# Patient Record
Sex: FEMALE | Race: WHITE | NOT HISPANIC OR LATINO | Employment: STUDENT | URBAN - METROPOLITAN AREA
[De-identification: names, ages, dates, MRNs, and addresses within clinical notes are randomized per-mention and may not be internally consistent; named-entity substitution may affect disease eponyms.]

---
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
## 2024-03-16 ENCOUNTER — HOSPITAL ENCOUNTER (EMERGENCY)
Facility: HOSPITAL | Age: 19
Discharge: HOME | End: 2024-03-16
Attending: EMERGENCY MEDICINE | Admitting: EMERGENCY MEDICINE
Payer: COMMERCIAL

## 2024-03-16 VITALS
HEIGHT: 63 IN | TEMPERATURE: 97 F | BODY MASS INDEX: 23.62 KG/M2 | RESPIRATION RATE: 16 BRPM | WEIGHT: 133.3 LBS | OXYGEN SATURATION: 99 % | SYSTOLIC BLOOD PRESSURE: 122 MMHG | HEART RATE: 70 BPM | DIASTOLIC BLOOD PRESSURE: 62 MMHG

## 2024-03-16 DIAGNOSIS — S09.90XA CLOSED HEAD INJURY, INITIAL ENCOUNTER: ICD-10-CM

## 2024-03-16 PROCEDURE — 99282 EMERGENCY DEPT VISIT SF MDM: CPT

## 2024-03-16 ASSESSMENT — COLUMBIA-SUICIDE SEVERITY RATING SCALE - C-SSRS
1. IN THE PAST MONTH, HAVE YOU WISHED YOU WERE DEAD OR WISHED YOU COULD GO TO SLEEP AND NOT WAKE UP?: NO
6. HAVE YOU EVER DONE ANYTHING, STARTED TO DO ANYTHING, OR PREPARED TO DO ANYTHING TO END YOUR LIFE?: NO
2. HAVE YOU ACTUALLY HAD ANY THOUGHTS OF KILLING YOURSELF IN THE PAST MONTH?: NO

## 2024-03-16 ASSESSMENT — ACTIVITIES OF DAILY LIVING (ADL)
ADLS_ACUITY_SCORE: 33
ADLS_ACUITY_SCORE: 33

## 2024-03-16 NOTE — ED PROVIDER NOTES
"EMERGENCY DEPARTMENT NOTE     Name: Sydine Mendoza    Age/Sex: 18 year old female   MRN: [id number]   Evaluation Date & Time:  3/16/2024  5:03 PM    PCP:    [facility]   ED Provider: Emeka Mancia D.O.       CHIEF COMPLAINT    Fall and Head Injury       DIAGNOSIS & DISPOSITION/MEDICAL DECISION MAKING     1. Closed head injury, initial encounter        Sydnie Mendoza is a 18 year old female without significant past medical history who presents to the emergency department for evaluation of head injury    Differential  diagnosis considered included but not limited to neck process including skull fracture, ICH, subdural, cervical spine fracture    Medical Decision Making  Patient presenting after closed head injury while snowboarding.  No loss of consciousness.  No subsequent headache or vomiting.  Patient on exam at 2 cm left occipital hematoma, cervical spine nontender.  Neurologically intact.  Discussed risks and benefits of CT including long-term risk of radiation.  After discussion patient and mother were verbal with discharge with continued monitoring at home.  Currently no symptoms of concussion but discussed concussion management.  Patient will be discharged.  She was Tylenol ibuprofen for any mild headaches.  If severe headache not improved with Tylenol ibuprofen, vomiting or change in mental status will be return to the emergency department.    Interventions:None  Discharge Vital Signs:/62   Pulse 70   Temp 97  F (36.1  C) (Temporal)   Resp 16   Ht 1.6 m (5' 3\")   Wt 60.5 kg (133 lb 4.8 oz)   LMP 12/26/2023   SpO2 99%   BMI 23.61 kg/m       DISPOSITION: Home    Diagnostic studies:  Imaging:  No orders to display      Lab:  Labs Ordered and Resulted from Time of ED Arrival to Time of ED Departure - No data to display            Triage note reviewed:Pt presents after a fall while snowboarding where she caught an edge and hit the back of her head on a tree. Large lump to back " of head. Denies LOC. Pt has had a concussion in the past. Took 2 Advil after fall. Complains of neck soreness but states it always hurts.      Triage Assessment (Adult)       Row Name 03/16/24 1656          Triage Assessment    Airway WDL WDL        Respiratory WDL    Respiratory WDL WDL        Peripheral/Neurovascular WDL    Peripheral Neurovascular WDL WDL                         History:  Supplemental history from: Patient's mother  External Record(s) reviewed: Primary care office visit November 2023, pulmonology televisit December 22, 2023    Work Up:  Chart documentation includes differential considered and any EKGs or imaging independently interpreted by provider, where specified.  In additional to work up documented, I considered the following work up: CT of the head but deferred after shared decision-making conversation    External consultation:  Discussion of management with another provider: NA    Complicating factors:  Care impacted by chronic illness: N/A  Care affected by social determinants of health: Access to medical care    Disposition considerations: Discharge. No recommendations on prescription strength medication(s). N/A.    At the conclusion of the encounter I discussed the results of all of the tests and the disposition. The questions were answered. The patient or family acknowledged understanding and was agreeable with the care plan.    TOTAL CRITICAL CARE TIME (EXCLUDING PROCEDURES): Not applicable    PROCEDURES:   None    EMERGENCY DEPARTMENT COURSE   5:44 PM I met with the patient to gather history and to perform my initial exam.  We discussed treatment options and the plan for care while in the Emergency Department.    ED INTERVENTIONS   Medications - No data to display    DISCHARGE MEDICATIONS        Review of your medicines        UNREVIEWED medicines. Ask your doctor about these medicines        Dose / Directions   ibuprofen 200 MG tablet  Commonly known as: ADVIL/MOTRIN      Dose: 200  "mg  Take 200 mg by mouth every 4 hours as needed for pain  Refills: 0     predniSONE 20 MG tablet  Commonly known as: DELTASONE  Used for: Acute pharyngitis, unspecified etiology      40mg daily for 3 days  Quantity: 6 tablet  Refills: 0                INFORMATION SOURCE AND LIMITATIONS    History/Exam limitations: N/A  Patient information was obtained from: Patient, Patient's mother  Use of : N/A  HISTORY OF PRESENT ILLNESS   Sydnie Mendoza is a 18 year old year old female with no relevant past history , who presents to this ED for evaluation of head injury.    Patient reports snowboarding earlier today when she hit her head on a tree. She reports that she was not wearing her helmet. She states that she feels asymptomatic.    Patient denies any recent loss of consciousness or vomiting. No other complaints at this time.     REVIEW OF SYSTEMS:   All other systems reviewed and are negative except as noted above in HPI.    PATIENT HISTORY   No past medical history on file.  There is no problem list on file for this patient.    No past surgical history on file.    Allergies   Allergen Reactions    Amoxicillin Hives       OUTPATIENT MEDICATIONS     New Prescriptions    No medications on file      Vitals:    03/16/24 1655 03/16/24 1809   BP: 120/65 122/62   Pulse: 72 70   Resp: 18 16   Temp: 97  F (36.1  C)    TempSrc: Temporal    SpO2: 97% 99%   Weight: 60.5 kg (133 lb 4.8 oz)    Height: 1.6 m (5' 3\")        Physical Exam   Constitutional: Oriented to person, place, and time. Appears well-developed and well-nourished.   HEENT:    Head:2 cm hematoma on left occipital scalp.C-spine is nontender.  Neck: Normal range of motion. Neck supple.   Cardiovascular: Normal rate, regular rhythm and normal heart sounds.    Pulmonary/Chest: Normal effort  and breath sounds normal.  Chest wall atraumatic  Abdominal: Soft. Bowel sounds are normal.  No abdominal wall ecchymosis  Musculoskeletal: Tenderness to the thoracic or " lumbar spine, pelvis/hips, axial skeleton  Neurological: Alert and oriented to person, place, and time. Normal strength. No sensory deficit. No cranial nerve deficit.      DIAGNOSTICS    LABORATORY FINDINGS (REVIEWED AND INTERPRETED):  Labs Ordered and Resulted from Time of ED Arrival to Time of ED Departure - No data to display      IMAGING (REVIEWED AND INTERPRETED):  No orders to display               ILEATHA , am serving as a scribe to document services personally performed by Emeka Mancia D.O., based on my observation and the provider s statements to me.    I, Emeka Mancia D.O., attest that LEATHA LONGO  is acting in a scribe capacity, has observed my performance of the services and has documented them in accordance with my direction.    Emeka Mancia D.O.  EMERGENCY MEDICINE   03/16/24  [facility] EMERGENCY DEPARTMENT  [address] [place] [id number]  [phone]  Dept: [phone]     mEeka Mancia DO  03/17/24 0259

## 2024-03-16 NOTE — ED TRIAGE NOTES
Pt presents after a fall while snowboarding where she caught an edge and hit the back of her head on a tree. Large lump to back of head. Denies LOC. Pt has had a concussion in the past. Took 2 Advil after fall. Complains of neck soreness but states it always hurts.      Triage Assessment (Adult)       Row Name 03/16/24 1679          Triage Assessment    Airway WDL WDL        Respiratory WDL    Respiratory WDL WDL        Peripheral/Neurovascular WDL    Peripheral Neurovascular WDL WDL

## 2024-03-16 NOTE — DISCHARGE INSTRUCTIONS
Use Tylenol ibuprofen if you develop mild headache.  If you develop severe headache not improved with Tylenol or ibuprofen, have vomiting or change in mental status return to the emergency department.  You may develop symptoms of concussion which would include fatigue, nausea and intermittent headaches.  Limit your activities as needed.  If you are having symptoms of concussion follow-up with your primary care clinic next week.

## 2024-03-16 NOTE — ED NOTES
Pt a/o x 3 was snowboarding and fell hitting the back of her head on a tree. She was not wearing a carlos.

## 2024-04-19 ENCOUNTER — MEDICAL CORRESPONDENCE (OUTPATIENT)
Dept: HEALTH INFORMATION MANAGEMENT | Facility: CLINIC | Age: 19
End: 2024-04-19
Payer: COMMERCIAL

## 2024-04-23 ENCOUNTER — TRANSCRIBE ORDERS (OUTPATIENT)
Dept: OTHER | Age: 19
End: 2024-04-23

## 2024-04-23 DIAGNOSIS — R51.9 CHRONIC NONINTRACTABLE HEADACHE, UNSPECIFIED HEADACHE TYPE: Primary | ICD-10-CM

## 2024-04-23 DIAGNOSIS — G89.29 CHRONIC NONINTRACTABLE HEADACHE, UNSPECIFIED HEADACHE TYPE: Primary | ICD-10-CM

## 2024-09-03 ENCOUNTER — TELEPHONE (OUTPATIENT)
Dept: NURSING | Facility: CLINIC | Age: 19
End: 2024-09-03
Payer: COMMERCIAL

## 2024-09-03 ENCOUNTER — TELEPHONE (OUTPATIENT)
Dept: FAMILY MEDICINE | Facility: CLINIC | Age: 19
End: 2024-09-03

## 2024-09-03 NOTE — TELEPHONE ENCOUNTER
Patient called and spoke with TC. States she needs TB test today for class and she is a student at [employer], does not know if she needs mantoux or blood draw. Has not been seen in clinic here in [place] before. Hung up on TC.     Patient will need a provider appointment to place orders for TB test. RN attempted to reach patient and left message requesting a return call. Perri Curtis was willing to work in to schedule today, no appointments left in clinic. If she returns call, she will need appointment with provider.

## 2024-09-03 NOTE — TELEPHONE ENCOUNTER
Patient called to follow-up on letter for going abroad.  Pt states she was trying to call [facility].  Pt was informed she called wrong clinic.

## 2024-12-18 ENCOUNTER — OFFICE VISIT (OUTPATIENT)
Dept: PEDIATRICS | Facility: CLINIC | Age: 19
End: 2024-12-18
Payer: COMMERCIAL

## 2024-12-18 VITALS
HEIGHT: 64 IN | WEIGHT: 143 LBS | HEART RATE: 84 BPM | SYSTOLIC BLOOD PRESSURE: 112 MMHG | RESPIRATION RATE: 18 BRPM | DIASTOLIC BLOOD PRESSURE: 74 MMHG | OXYGEN SATURATION: 96 % | BODY MASS INDEX: 24.41 KG/M2

## 2024-12-18 DIAGNOSIS — Z11.4 SCREENING FOR HIV (HUMAN IMMUNODEFICIENCY VIRUS): ICD-10-CM

## 2024-12-18 DIAGNOSIS — J02.9 ACUTE PHARYNGITIS, UNSPECIFIED ETIOLOGY: Primary | ICD-10-CM

## 2024-12-18 DIAGNOSIS — Z11.3 SCREENING FOR STDS (SEXUALLY TRANSMITTED DISEASES): ICD-10-CM

## 2024-12-18 DIAGNOSIS — Z11.59 NEED FOR HEPATITIS C SCREENING TEST: ICD-10-CM

## 2024-12-18 LAB
ALBUMIN SERPL BCG-MCNC: 4 G/DL (ref 3.5–5.2)
ALP SERPL-CCNC: 78 U/L (ref 40–150)
ALT SERPL W P-5'-P-CCNC: 24 U/L (ref 0–50)
ANION GAP SERPL CALCULATED.3IONS-SCNC: 10 MMOL/L (ref 7–15)
AST SERPL W P-5'-P-CCNC: 24 U/L (ref 0–45)
BASOPHILS # BLD AUTO: 0 10E3/UL (ref 0–0.2)
BASOPHILS NFR BLD AUTO: 0 %
BILIRUB SERPL-MCNC: 0.2 MG/DL
BUN SERPL-MCNC: 7.2 MG/DL (ref 6–20)
CALCIUM SERPL-MCNC: 9.5 MG/DL (ref 8.8–10.4)
CHLORIDE SERPL-SCNC: 105 MMOL/L (ref 98–107)
CREAT SERPL-MCNC: 0.63 MG/DL (ref 0.51–0.95)
EGFRCR SERPLBLD CKD-EPI 2021: >90 ML/MIN/1.73M2
EOSINOPHIL # BLD AUTO: 0.3 10E3/UL (ref 0–0.7)
EOSINOPHIL NFR BLD AUTO: 2 %
ERYTHROCYTE [DISTWIDTH] IN BLOOD BY AUTOMATED COUNT: 11.3 % (ref 10–15)
FLUAV AG SPEC QL IA: NEGATIVE
FLUBV AG SPEC QL IA: NEGATIVE
GLUCOSE SERPL-MCNC: 139 MG/DL (ref 70–99)
HCO3 SERPL-SCNC: 25 MMOL/L (ref 22–29)
HCT VFR BLD AUTO: 42.3 % (ref 35–47)
HCV AB SERPL QL IA: NONREACTIVE
HGB BLD-MCNC: 14.2 G/DL (ref 11.7–15.7)
IMM GRANULOCYTES # BLD: 0 10E3/UL
IMM GRANULOCYTES NFR BLD: 0 %
LYMPHOCYTES # BLD AUTO: 2.2 10E3/UL (ref 0.8–5.3)
LYMPHOCYTES NFR BLD AUTO: 17 %
MCH RBC QN AUTO: 30 PG (ref 26.5–33)
MCHC RBC AUTO-ENTMCNC: 33.6 G/DL (ref 31.5–36.5)
MCV RBC AUTO: 89 FL (ref 78–100)
MONOCYTES # BLD AUTO: 0.5 10E3/UL (ref 0–1.3)
MONOCYTES NFR BLD AUTO: 4 %
MONOCYTES NFR BLD AUTO: NEGATIVE %
NEUTROPHILS # BLD AUTO: 9.6 10E3/UL (ref 1.6–8.3)
NEUTROPHILS NFR BLD AUTO: 76 %
PLATELET # BLD AUTO: 388 10E3/UL (ref 150–450)
POTASSIUM SERPL-SCNC: 3.9 MMOL/L (ref 3.4–5.3)
PROT SERPL-MCNC: 6.8 G/DL (ref 6.4–8.3)
RBC # BLD AUTO: 4.74 10E6/UL (ref 3.8–5.2)
SODIUM SERPL-SCNC: 140 MMOL/L (ref 135–145)
WBC # BLD AUTO: 12.6 10E3/UL (ref 4–11)

## 2024-12-18 PROCEDURE — 87804 INFLUENZA ASSAY W/OPTIC: CPT

## 2024-12-18 PROCEDURE — 85025 COMPLETE CBC W/AUTO DIFF WBC: CPT

## 2024-12-18 PROCEDURE — 87591 N.GONORRHOEAE DNA AMP PROB: CPT

## 2024-12-18 PROCEDURE — 86780 TREPONEMA PALLIDUM: CPT

## 2024-12-18 PROCEDURE — 36415 COLL VENOUS BLD VENIPUNCTURE: CPT

## 2024-12-18 PROCEDURE — 87389 HIV-1 AG W/HIV-1&-2 AB AG IA: CPT

## 2024-12-18 PROCEDURE — 80053 COMPREHEN METABOLIC PANEL: CPT

## 2024-12-18 PROCEDURE — 86308 HETEROPHILE ANTIBODY SCREEN: CPT

## 2024-12-18 PROCEDURE — 87491 CHLMYD TRACH DNA AMP PROBE: CPT

## 2024-12-18 PROCEDURE — 86803 HEPATITIS C AB TEST: CPT

## 2024-12-18 RX ORDER — PREDNISONE 20 MG/1
40 TABLET ORAL DAILY
Qty: 6 TABLET | Refills: 0 | OUTPATIENT
Start: 2024-12-18 | End: 2024-12-21

## 2024-12-18 RX ORDER — DROSPIRENONE AND ETHINYL ESTRADIOL 0.03MG-3MG
KIT ORAL
COMMUNITY

## 2024-12-18 RX ORDER — BUPROPION HYDROCHLORIDE 150 MG/1
150 TABLET ORAL DAILY
COMMUNITY

## 2024-12-18 ASSESSMENT — ENCOUNTER SYMPTOMS
COUGH: 1
SORE THROAT: 1

## 2024-12-18 NOTE — PROGRESS NOTES
Assessment & Plan   Sydnie is a 19-year-old female without significant medical history presenting with 11 days of persistent cough, congestion, and painful sore throat without fever or other systemic symptoms.  CBC significant only for mild leukocytosis (WBC 12.6), flu and mono negative. Cause of her symptoms is not clear at this time, but viral respiratory illness with associated throat inflammation is most likely. Given significant inflammatory throat pain, will treat with a short steroid burst. Advised continuing ibuprofen and tylenol, but limiting ibuprofen to 600mg q6h.    (J02.9) Acute pharyngitis, unspecified etiology  (primary encounter diagnosis)  Plan: Influenza A & B Antigen - Clinic Collect,         Mononucleosis screen, CBC with platelets and         differential, Comprehensive metabolic panel   - Prednisone 40mg daily x3 days    (Z11.3) Screening for STDs (sexually transmitted diseases)  Plan: Chlamydia trachomatis/Neisseria gonorrhoeae by         PCR- VAGINAL SELF-SWAB, Treponema Abs w Reflex         to RPR and Titer    (Z11.4) Screening for HIV (human immunodeficiency virus)  Plan: HIV Antigen Antibody Combo    (Z11.59) Need for hepatitis C screening test  Plan: Hepatitis C Screen Reflex to HCV RNA Quant and         Genotype    Signed:  Agustin Dumont MD  PGY2, Pediatrics  [facility]    Plan of care discussed with Dr. Hendrickson, attending pediatrician.      {FOLLOW UP PLANS (Optional) Includes COVID19 Treatment Plan:589542}    Allie Otoole is a 19 year old, presenting for the following health issues:  Cough (r90wkta) and Pharyngitis (c36ozdr)      12/18/2024     1:01 PM   Additional Questions   Roomed by avni Otoole is a 19-year-old female presenting with sore throat.  Her symptoms started 11 days ago on 12/7 with sore throat.  She briefly had chills for a few hours that first evening, which resolved.  The next day she developed congestion and cough which have been ongoing since  "then.  The congestion has gotten slightly better, but the sore throat has gotten worse.  She describes the sore throat as muscular pain with swallowing, less topical pain in the back of her throat.  She is able to drink water and eat gentle foods.  The throat pain has been so severe that it is disrupting her sleep.  She has been taking 800 mg ibuprofen every 6 hours for the last 4 days along with alternating Tylenol with little improvement in her pain.  She has not had any fever.  She is stooling normally, peeing normally, and her energy level is fine.    She reports that last year around this time she had a similar prolonged illness for which a cause was not identified.  After around a month of symptoms, she was given a short burst of steroids which seemed to help her.    She just flew in from [place] from studying abroad. She has been living with roommates in apartment, none of them are sick. Sexually active, denies oral sexual activity.    Home meds: sertraline, buproprion, OCP.     History of Present Illness       Reason for visit:  Cold  Symptom onset:  1-2 weeks ago  Symptoms include:  Sore throat cough congestion high pain  Symptom intensity:  Severe  Symptom progression:  Staying the same  Had these symptoms before:  Yes  Has tried/received treatment for these symptoms:  Yes  Previous treatment was successful:  Yes  Prior treatment description:  Prednisone  What makes it worse:  Talking swallowing and coughing  What makes it better:  Drinking hot drinks very temprary relief   She is taking medications regularly.       {MA/LPN/RN Pre-Provider Visit Orders- hCG/UA/Strep (Optional):937793}  {SUPERLIST (Optional):073424}  {additonal problems for provider to add (Optional):675693}    {ROS Picklists (Optional):213069}      Objective    /74   Pulse 84   Resp 18   Ht 5' 3.5\" (1.613 m)   Wt 143 lb (64.9 kg)   SpO2 96%   BMI 24.93 kg/m    Body mass index is 24.93 kg/m .  Physical Exam  Constitutional:       " General: She is not in acute distress.  HENT:      Head: Normocephalic and atraumatic.      Nose: Congestion present. No rhinorrhea.      Mouth/Throat:      Mouth: Mucous membranes are moist.      Comments: Posterior oropharyngeal erythema without exudates or focal lesions  Eyes:      Conjunctiva/sclera: Conjunctivae normal.   Cardiovascular:      Rate and Rhythm: Normal rate and regular rhythm.      Pulses: Normal pulses.   Pulmonary:      Effort: Pulmonary effort is normal. No respiratory distress.      Breath sounds: Normal breath sounds.   Abdominal:      General: There is no distension.   Musculoskeletal:         General: Normal range of motion.      Cervical back: Normal range of motion.   Skin:     General: Skin is warm and dry.      Capillary Refill: Capillary refill takes less than 2 seconds.   Neurological:      General: No focal deficit present.      Mental Status: She is alert.        {Exam List (Optional):187862}    {Diagnostic Test Results (Optional):036632}        Signed Electronically by: Koko Peres DO  {Email feedback regarding this note to [email or web address]   :150313}

## 2024-12-19 LAB
C TRACH DNA SPEC QL PROBE+SIG AMP: NEGATIVE
HIV 1+2 AB+HIV1 P24 AG SERPL QL IA: NONREACTIVE
N GONORRHOEA DNA SPEC QL NAA+PROBE: NEGATIVE
T PALLIDUM AB SER QL: NONREACTIVE

## 2024-12-20 ENCOUNTER — TELEPHONE (OUTPATIENT)
Dept: PEDIATRICS | Facility: CLINIC | Age: 19
End: 2024-12-20

## 2024-12-20 NOTE — TELEPHONE ENCOUNTER
----- Message from Anna Marie Hendrickson sent at 12/20/2024  7:52 AM CST -----  Team - please call patient with results. Please let her know that labs were unremarkable. Please see how she is doing. If she is not feeling better I will do a short dose of steroids. Please ask which pharmacy she would prefer. Thank you.     - Dr. Hendrickson

## 2024-12-20 NOTE — TELEPHONE ENCOUNTER
"Patient returning call. States that she feels like she is \"over the hump\" and starting to feel better. She has also decreased the amount of ibuprofen she has been taking. Does not feel she needs steroids at this time. Routing to provider as FYI  "

## 2024-12-20 NOTE — TELEPHONE ENCOUNTER
Great! Will hold off on steroids at this time.     Geovanna Hendrickson MD  Pediatrician  [facility]

## 2024-12-21 ENCOUNTER — NURSE TRIAGE (OUTPATIENT)
Dept: NURSING | Facility: CLINIC | Age: 19
End: 2024-12-21
Payer: COMMERCIAL

## 2024-12-21 DIAGNOSIS — H66.40 SUPPURATIVE OTITIS MEDIA, UNSPECIFIED CHRONICITY, UNSPECIFIED LATERALITY: Primary | ICD-10-CM

## 2024-12-21 NOTE — TELEPHONE ENCOUNTER
Nurse Triage SBAR    Situation: Left Earache     Background: Patient calling. Pt was seen on Wednesday for cold symptoms.     Assessment: Patient is having left ear pain now and and she is calling for treatment.The ear has been itching on the inside for the last 2 days. Lastnight the pain started. Pt denied feeling feverish. Pain level is 5/10. She is feeling dizzy and light headed.     Protocol Recommended Disposition: See Physician within 4 hours (Or PCP Triage)    Recommendation: According to the protocol, Patient should be seen within 4 hours (Or PCP Triage). Advised Patient that the patient needs to be seen within 4 hours (Or PCP Triage). Care advice given. Patient verbalizes that the provider she seen on Wednesday told her if she has any worsening symptoms to send a message for treatment and she is now having an earache and would like something prescribed. She wants a message sent to the provider. Patient was informed that it most likely won't be reviewed until the clinic is open and there is no guarantee they will prescribe anything without her being see. Pt stated understand but still wanted the message sent.     Marilynn Barger, DOROTA Nursing Advisor 12/21/2024 9:36 AM     Reason for Disposition   Walking is very unsteady or feels very dizzy    Additional Information   Negative: [1] Stiff neck (can't touch chin to chest) AND [2] fever   Negative: [1] Bony area of skull behind the ear is pink or swollen AND [2] fever   Negative: Followed an ear injury   Negative: [1] Recently diagnosed with otitis media AND [2] currently on oral antibiotics   Negative: Foreign body stuck in the ear (e.g., bug, piece of cotton)   Negative: Moving the earlobe or touching the ear clearly increases the pain   Negative: Fever > 104 F (40 C)   Negative: Patient sounds very sick or weak to the triager   Negative: [1] SEVERE pain AND [2] not improved 2 hours after taking analgesic medication (e.g., ibuprofen or acetaminophen)    Protocols  used: Earache-A-AH

## 2024-12-23 RX ORDER — CEFDINIR 300 MG/1
300 CAPSULE ORAL 2 TIMES DAILY
Qty: 14 CAPSULE | Refills: 0 | Status: SHIPPED | OUTPATIENT
Start: 2024-12-23 | End: 2024-12-30

## 2024-12-23 NOTE — TELEPHONE ENCOUNTER
Called the patient to see how she is doing and if she went in to be seen due to her sx. Message was routed to a resident who does not have someone covering their inbox.    DOROTA Vela

## 2024-12-23 NOTE — TELEPHONE ENCOUNTER
I sent in antibiotics if patient is still having symptoms.  She can fill these if still having severe ear pain.    She should take a probiotic to decrease the risk of diarrhea.  And recheck if symptoms are not improving.

## 2024-12-23 NOTE — TELEPHONE ENCOUNTER
OV 12/16:    Addendum: Steroid not sent day of visit. Will have team reach out to triage and send if there are ongoing symptoms. Please see nurse triage in results.     Patient triaged over the weekend.    She followed-up today with a [employer] message:    Hello,  I am having increasing pain in my left ear and as it was noted in my appointment this week that I had fluid in my ears I am very concerned about an ear infection having developed. Are you able to prescribe antibiotics without an appointment or do I need to come back in via urgent care this weekend?     Sydnie Mendoza

## 2024-12-23 NOTE — TELEPHONE ENCOUNTER
"Call to patient. She used \"[facility]\" over the weekend and got a prescription for cefdinir already. Relayed provider message and she stated understanding. She had no further questions at this time.       "

## 2025-01-18 ENCOUNTER — HEALTH MAINTENANCE LETTER (OUTPATIENT)
Age: 20
End: 2025-01-18